# Patient Record
Sex: FEMALE | Race: WHITE
[De-identification: names, ages, dates, MRNs, and addresses within clinical notes are randomized per-mention and may not be internally consistent; named-entity substitution may affect disease eponyms.]

---

## 2023-08-28 PROBLEM — Z00.00 ENCOUNTER FOR PREVENTIVE HEALTH EXAMINATION: Status: ACTIVE | Noted: 2023-08-28

## 2023-08-30 ENCOUNTER — NON-APPOINTMENT (OUTPATIENT)
Age: 33
End: 2023-08-30

## 2023-08-30 ENCOUNTER — APPOINTMENT (OUTPATIENT)
Dept: PULMONOLOGY | Facility: CLINIC | Age: 33
End: 2023-08-30
Payer: COMMERCIAL

## 2023-08-30 VITALS
DIASTOLIC BLOOD PRESSURE: 58 MMHG | OXYGEN SATURATION: 98 % | BODY MASS INDEX: 18.78 KG/M2 | TEMPERATURE: 98.3 F | HEIGHT: 64 IN | WEIGHT: 110 LBS | HEART RATE: 84 BPM | SYSTOLIC BLOOD PRESSURE: 98 MMHG

## 2023-08-30 DIAGNOSIS — R06.2 WHEEZING: ICD-10-CM

## 2023-08-30 DIAGNOSIS — Z87.09 PERSONAL HISTORY OF OTHER DISEASES OF THE RESPIRATORY SYSTEM: ICD-10-CM

## 2023-08-30 DIAGNOSIS — R06.02 SHORTNESS OF BREATH: ICD-10-CM

## 2023-08-30 DIAGNOSIS — J45.909 UNSPECIFIED ASTHMA, UNCOMPLICATED: ICD-10-CM

## 2023-08-30 DIAGNOSIS — Z87.01 PERSONAL HISTORY OF PNEUMONIA (RECURRENT): ICD-10-CM

## 2023-08-30 PROCEDURE — 99204 OFFICE O/P NEW MOD 45 MIN: CPT

## 2023-08-30 RX ORDER — FLUTICASONE FUROATE AND VILANTEROL 100; 25 UG/1; UG/1
100-25 POWDER RESPIRATORY (INHALATION)
Qty: 1 | Refills: 2 | Status: DISCONTINUED | COMMUNITY
Start: 2023-08-30 | End: 2023-08-30

## 2023-08-30 RX ORDER — ALBUTEROL SULFATE 90 UG/1
108 (90 BASE) INHALANT RESPIRATORY (INHALATION)
Refills: 0 | Status: ACTIVE | COMMUNITY

## 2023-08-30 RX ORDER — FLUTICASONE FUROATE AND VILANTEROL 100; 25 UG/1; UG/1
100-25 POWDER RESPIRATORY (INHALATION)
Qty: 1 | Refills: 2 | Status: ACTIVE | COMMUNITY
Start: 2023-08-30 | End: 1900-01-01

## 2023-08-30 RX ORDER — NORGESTIMATE AND ETHINYL ESTRADIOL 0.25-0.035
0.25-35 KIT ORAL
Refills: 0 | Status: ACTIVE | COMMUNITY

## 2023-08-30 NOTE — ASSESSMENT
[FreeTextEntry1] : 8/30/2023 just sent Beryl has had more respiratory symptoms from her asthma.  On physical exam the patient had some decreased breath sounds but no wheezing on her spirometry the FEV1 and FVC were normal.  I will acknowledge her sx and RX her with a maintenance medication.  I have prescribed generic Breo and advised the patient if she can get it at a reasonable price to try [facility] mail order.  Patient called back stated I should call the prescription into the [facility] site.  The patient was asked to return to the office in 6 weeks to assess her progress patient was also told to call the office if there is any issue with medication or her pulmonary health time spent 45min  counseling, education, documentation, imaging reviewed, medication reviewed, inhaler demonstrated, old records reviewed, HX and PE

## 2023-08-30 NOTE — HISTORY OF PRESENT ILLNESS
[Never] : never [Current] : current [TextBox_4] : 8/30/2023 Ssuhila Britt is a 89year-old female with a history of asthma.  Patient has had increased symptoms.  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

## 2023-08-30 NOTE — PHYSICAL EXAM
[No Acute Distress] : no acute distress [Normal Oropharynx] : normal oropharynx [Normal Appearance] : normal appearance [No Neck Mass] : no neck mass [Normal Rate/Rhythm] : normal rate/rhythm [Normal S1, S2] : normal s1, s2 [No Murmurs] : no murmurs [No Resp Distress] : no resp distress [Clear to Auscultation Bilaterally] : clear to auscultation bilaterally [No Abnormalities] : no abnormalities [Benign] : benign [Normal Gait] : normal gait [No Clubbing] : no clubbing [No Cyanosis] : no cyanosis [No Edema] : no edema [FROM] : FROM [Normal Color/ Pigmentation] : normal color/ pigmentation [No Focal Deficits] : no focal deficits [Oriented x3] : oriented x3 [Normal Affect] : normal affect [TextBox_68] : decreased bs

## 2023-08-30 NOTE — REASON FOR VISIT
[Initial] : an initial visit [Sleep Evaluation] : sleep evaluation [Asthma] : asthma [Sleep Apnea] : sleep apnea [Shortness of Breath] : shortness of breath [Wheezing] : wheezing [TextBox_44] : Patient states she has had asthma from second grade.  It has been under control until recently.  She only had to use her albuterol once or twice a week.  About 2 months it has progressed.  She has been using her inhaler 5-7 times a day.  She has SOB, chest tightness and wheezes.  Patient states she snores and stops breathing during her sleep.

## 2023-08-30 NOTE — REVIEW OF SYSTEMS
[SOB on Exertion] : sob on exertion [Seasonal Allergies] : seasonal allergies [Numbness] : numbness [Anxiety] : anxiety [Negative] : Genitourinary [Cough] : no cough [Sputum] : no sputum [Dyspnea] : no dyspnea [GERD] : no gerd [Diarrhea] : no diarrhea [Constipation] : no constipation [Dysphagia] : no dysphagia [Hepatic Disease] : no hepatic disease [Anemia] : no anemia [Depression] : no depression [Panic Attacks] : no panic attacks [Diabetes] : no diabetes [Thyroid Problem] : no thyroid problem [Obesity] : no obesity [TextBox_14] : deviated septum  [TextBox_30] : talk, laugh and exercise are triggers for her asthma  [TextBox_57] : dust , mold pet dander has two dogs  not to pollen  [TextBox_94] : joint problems and went for evaluation  [TextBox_69] : occasional ETOH  [TextBox_104] : eczema  [TextBox_122] : has gone to neurologist for numbness but has resolved, hx of seizures has seen neurologist not grand mal

## 2023-10-09 ENCOUNTER — APPOINTMENT (OUTPATIENT)
Dept: PULMONOLOGY | Facility: CLINIC | Age: 33
End: 2023-10-09